# Patient Record
Sex: MALE | Race: WHITE | URBAN - METROPOLITAN AREA
[De-identification: names, ages, dates, MRNs, and addresses within clinical notes are randomized per-mention and may not be internally consistent; named-entity substitution may affect disease eponyms.]

---

## 2017-04-21 ENCOUNTER — OFFICE VISIT (OUTPATIENT)
Dept: FAMILY MEDICINE | Facility: CLINIC | Age: 27
End: 2017-04-21
Payer: COMMERCIAL

## 2017-04-21 VITALS
SYSTOLIC BLOOD PRESSURE: 122 MMHG | HEART RATE: 100 BPM | TEMPERATURE: 98.2 F | BODY MASS INDEX: 27.78 KG/M2 | OXYGEN SATURATION: 98 % | HEIGHT: 67 IN | DIASTOLIC BLOOD PRESSURE: 74 MMHG | WEIGHT: 177 LBS | RESPIRATION RATE: 14 BRPM

## 2017-04-21 DIAGNOSIS — F98.8 ADD (ATTENTION DEFICIT DISORDER): ICD-10-CM

## 2017-04-21 PROCEDURE — 99214 OFFICE O/P EST MOD 30 MIN: CPT | Performed by: FAMILY MEDICINE

## 2017-04-21 RX ORDER — LISDEXAMFETAMINE DIMESYLATE 40 MG/1
40 CAPSULE ORAL EVERY MORNING
Qty: 30 CAPSULE | Refills: 0 | Status: SHIPPED | OUTPATIENT
Start: 2017-05-21

## 2017-04-21 RX ORDER — LISDEXAMFETAMINE DIMESYLATE 40 MG/1
40 CAPSULE ORAL DAILY
Qty: 30 CAPSULE | Refills: 0 | Status: SHIPPED | OUTPATIENT
Start: 2017-04-21

## 2017-04-21 RX ORDER — LISDEXAMFETAMINE DIMESYLATE 40 MG/1
40 CAPSULE ORAL EVERY MORNING
Qty: 30 CAPSULE | Refills: 0 | Status: SHIPPED | OUTPATIENT
Start: 2017-06-21

## 2017-04-21 NOTE — NURSING NOTE
"Chief Complaint   Patient presents with     Recheck Medication     Vyvanse       Initial /74  Pulse 100  Temp 98.2  F (36.8  C) (Oral)  Resp 14  Ht 5' 7\" (1.702 m)  Wt 177 lb (80.3 kg)  SpO2 98%  BMI 27.72 kg/m2 Estimated body mass index is 27.72 kg/(m^2) as calculated from the following:    Height as of this encounter: 5' 7\" (1.702 m).    Weight as of this encounter: 177 lb (80.3 kg).  BP completed using cuff size: regular    Health Maintenance that is potentially due pending provider review:  Health Maintenance Due   Topic Date Due     HPV IMMUNIZATION (1 of 3 - Male 3 Dose Series) 05/25/2003     TETANUS IMMUNIZATION (SYSTEM ASSIGNED)  05/25/2010     Pt unsure if Tdap is current and has not gotten HPV vaccines  "

## 2017-04-21 NOTE — PROGRESS NOTES
SUBJECTIVE:                                                    Marco Antonio Villafuerte is a 26 year old male who presents to clinic today for the following health issues:      Medication Followup of Vyvanse    Taking Medication as prescribed: yes    Side Effects:  None    Medication Helping Symptoms:  yes           Problem list and histories reviewed & adjusted, as indicated.  Additional history: as documented    Patient Active Problem List   Diagnosis     ADD (attention deficit disorder)     History reviewed. No pertinent surgical history.    Social History   Substance Use Topics     Smoking status: Light Tobacco Smoker     Types: Cigars     Smokeless tobacco: Former User     Alcohol use 2.4 oz/week     4 Standard drinks or equivalent per week     Family History   Problem Relation Age of Onset     DIABETES Paternal Grandfather      Substance Abuse Maternal Grandfather      EYE* Father          Current Outpatient Prescriptions   Medication Sig Dispense Refill     lisdexamfetamine (VYVANSE) 40 MG capsule Take 1 capsule (40 mg) by mouth daily 30 capsule 0     [START ON 5/21/2017] lisdexamfetamine (VYVANSE) 40 MG capsule Take 1 capsule (40 mg) by mouth every morning 30 capsule 0     [START ON 6/21/2017] lisdexamfetamine (VYVANSE) 40 MG capsule Take 1 capsule (40 mg) by mouth every morning 30 capsule 0     No Known Allergies  No lab results found.   BP Readings from Last 3 Encounters:   04/21/17 122/74   10/28/16 110/66    Wt Readings from Last 3 Encounters:   04/21/17 177 lb (80.3 kg)   10/28/16 167 lb (75.8 kg)                  Labs reviewed in EPIC    Reviewed and updated as needed this visit by clinical staff  Tobacco  Allergies  Meds  Med Hx  Surg Hx  Fam Hx  Soc Hx      Reviewed and updated as needed this visit by Provider         ROS:  Constitutional, HEENT, cardiovascular, pulmonary, GI, , musculoskeletal, neuro, skin, endocrine and psych systems are negative, except as otherwise noted.    OBJECTIVE:            "                                         /74  Pulse 100  Temp 98.2  F (36.8  C) (Oral)  Resp 14  Ht 5' 7\" (1.702 m)  Wt 177 lb (80.3 kg)  SpO2 98%  BMI 27.72 kg/m2  Body mass index is 27.72 kg/(m^2).  GENERAL: healthy, alert and no distress  EYES: Eyes grossly normal to inspection, PERRL and conjunctivae and sclerae normal  NECK: no adenopathy, no asymmetry, masses, or scars and thyroid normal to palpation  RESP: lungs clear to auscultation - no rales, rhonchi or wheezes  CV: regular rate and rhythm, normal S1 S2, no S3 or S4, no murmur, click or rub, no peripheral edema and peripheral pulses strong  ABDOMEN: soft, nontender, no hepatosplenomegaly, no masses and bowel sounds normal  MS: no gross musculoskeletal defects noted, no edema  NEURO: Normal strength and tone, mentation intact and speech normal    Diagnostic Test Results:  none      ASSESSMENT/PLAN:                                                        1. ADD (attention deficit disorder)  He denies any side effects , has been on the same med and dose for yrs , does take days off the med on weekends, doing well, refilled for three months   - lisdexamfetamine (VYVANSE) 40 MG capsule; Take 1 capsule (40 mg) by mouth daily  Dispense: 30 capsule; Refill: 0  - lisdexamfetamine (VYVANSE) 40 MG capsule; Take 1 capsule (40 mg) by mouth every morning  Dispense: 30 capsule; Refill: 0  - lisdexamfetamine (VYVANSE) 40 MG capsule; Take 1 capsule (40 mg) by mouth every morning  Dispense: 30 capsule; Refill: 0    F/u in three months , Pt is aware  and comfortable with the current plan.      Ghada Pisano MD  [facility]    "

## 2017-04-21 NOTE — MR AVS SNAPSHOT
"              After Visit Summary   2017    Marco Antonio Villafuerte    MRN: [id number]           Patient Information     Date Of Birth          1990        Visit Information        Provider Department      2017 9:30 AM Ghada Pisano MD [facility]        Today's Diagnoses     ADD (attention deficit disorder)           Follow-ups after your visit        Who to contact     If you have questions or need follow up information about today's clinic visit or your schedule please contact [facility] directly at [phone].  Normal or non-critical lab and imaging results will be communicated to you by [employer], letter or phone within 4 business days after the clinic has received the results. If you do not hear from us within 7 days, please contact the clinic through [employer] System[employer] or phone. If you have a critical or abnormal lab result, we will notify you by phone as soon as possible.  Submit refill requests through [employer] or call your pharmacy and they will forward the refill request to us. Please allow 3 business days for your refill to be completed.          Additional Information About Your Visit        MyChart Information     [employer] lets you send messages to your doctor, view your test results, renew your prescriptions, schedule appointments and more. To sign up, go to [email or web address] . Click on \"Log in\" on the left side of the screen, which will take you to the Welcome page. Then click on \"Sign up Now\" on the right side of the page.     You will be asked to enter the access code listed below, as well as some personal information. Please follow the directions to create your username and password.     Your access code is: [email or web address]  Expires: 2017 11:49 AM     Your access code will  in 90 days. If you need help or a new code, please call your [facility] or [phone].        Care EveryWhere ID     This is your Care EveryWhere ID. This could be used by other " "organizations to access your [place] medical records  [id number]        Your Vitals Were     Pulse Temperature Respirations Height Pulse Oximetry BMI (Body Mass Index)    100 98.2  F (36.8  C) (Oral) 14 5' 7\" (1.702 m) 98% 27.72 kg/m2       Blood Pressure from Last 3 Encounters:   04/21/17 122/74   10/28/16 110/66    Weight from Last 3 Encounters:   04/21/17 177 lb (80.3 kg)   10/28/16 167 lb (75.8 kg)              Today, you had the following     No orders found for display         Today's Medication Changes          These changes are accurate as of: 4/21/17 11:49 AM.  If you have any questions, ask your nurse or doctor.               These medicines have changed or have updated prescriptions.        Dose/Directions    * lisdexamfetamine 40 MG capsule   Commonly known as:  VYVANSE   This may have changed:  Another medication with the same name was added. Make sure you understand how and when to take each.   Used for:  ADD (attention deficit disorder)   Changed by:  Ghada Pisano MD        Dose:  40 mg   Take 1 capsule (40 mg) by mouth daily   Quantity:  30 capsule   Refills:  0       * lisdexamfetamine 40 MG capsule   Commonly known as:  VYVANSE   This may have changed:  You were already taking a medication with the same name, and this prescription was added. Make sure you understand how and when to take each.   Used for:  ADD (attention deficit disorder)   Changed by:  Ghada Pisano MD        Dose:  40 mg   Start taking on:  5/21/2017   Take 1 capsule (40 mg) by mouth every morning   Quantity:  30 capsule   Refills:  0       * lisdexamfetamine 40 MG capsule   Commonly known as:  VYVANSE   This may have changed:  You were already taking a medication with the same name, and this prescription was added. Make sure you understand how and when to take each.   Used for:  ADD (attention deficit disorder)   Changed by:  Ghada Pisano MD        Dose:  40 mg   Start taking on:  6/21/2017   Take 1 capsule (40 mg) by " mouth every morning   Quantity:  30 capsule   Refills:  0       * Notice:  This list has 3 medication(s) that are the same as other medications prescribed for you. Read the directions carefully, and ask your doctor or other care provider to review them with you.         Where to get your medicines      Some of these will need a paper prescription and others can be bought over the counter.  Ask your nurse if you have questions.     Bring a paper prescription for each of these medications     lisdexamfetamine 40 MG capsule    lisdexamfetamine 40 MG capsule    lisdexamfetamine 40 MG capsule                Primary Care Provider    None Specified       No primary provider on file.        Thank you!     Thank you for choosing [facility]  for your care. Our goal is always to provide you with excellent care. Hearing back from our patients is one way we can continue to improve our services. Please take a few minutes to complete the written survey that you may receive in the mail after your visit with us. Thank you!             Your Updated Medication List - Protect others around you: Learn how to safely use, store and throw away your medicines at www.disposemymeds.org.          This list is accurate as of: 4/21/17 11:49 AM.  Always use your most recent med list.                   Brand Name Dispense Instructions for use    * lisdexamfetamine 40 MG capsule    VYVANSE    30 capsule    Take 1 capsule (40 mg) by mouth daily       * lisdexamfetamine 40 MG capsule   Start taking on:  5/21/2017    VYVANSE    30 capsule    Take 1 capsule (40 mg) by mouth every morning       * lisdexamfetamine 40 MG capsule   Start taking on:  6/21/2017    VYVANSE    30 capsule    Take 1 capsule (40 mg) by mouth every morning       * Notice:  This list has 3 medication(s) that are the same as other medications prescribed for you. Read the directions carefully, and ask your doctor or other care provider to review them with you.

## 2017-04-26 ENCOUNTER — TELEPHONE (OUTPATIENT)
Dept: FAMILY MEDICINE | Facility: CLINIC | Age: 27
End: 2017-04-26

## 2017-04-26 NOTE — TELEPHONE ENCOUNTER
Initiated prior auth by making phone call to [employer]   at phone number [phone] for Vyvanse 40mg capsule.  ID# [id number]    Status:  Approved 4/01/2017 through 5/01/2018